# Patient Record
Sex: FEMALE | Race: WHITE | RURAL
[De-identification: names, ages, dates, MRNs, and addresses within clinical notes are randomized per-mention and may not be internally consistent; named-entity substitution may affect disease eponyms.]

---

## 2021-09-07 ENCOUNTER — OFFICE VISIT (OUTPATIENT)
Dept: RURAL CLINIC 4 | Facility: CLINIC | Age: 71
End: 2021-09-07
Payer: MEDICARE

## 2021-09-07 ENCOUNTER — CLAIMS CREATED FROM THE CLAIM WINDOW (OUTPATIENT)
Dept: RURAL CLINIC 4 | Facility: CLINIC | Age: 71
End: 2021-09-07
Payer: MEDICARE

## 2021-09-07 ENCOUNTER — WEB ENCOUNTER (OUTPATIENT)
Dept: RURAL CLINIC 4 | Facility: CLINIC | Age: 71
End: 2021-09-07

## 2021-09-07 DIAGNOSIS — Z86.010 HISTORY OF COLON POLYPS: ICD-10-CM

## 2021-09-07 DIAGNOSIS — Z80.0 FAMILY HISTORY OF RECTAL CANCER: ICD-10-CM

## 2021-09-07 DIAGNOSIS — K57.90 DIVERTICULOSIS: ICD-10-CM

## 2021-09-07 PROBLEM — 397881000: Status: ACTIVE | Noted: 2021-09-07

## 2021-09-07 PROBLEM — 428283002: Status: ACTIVE | Noted: 2021-09-07

## 2021-09-07 PROCEDURE — 99202 OFFICE O/P NEW SF 15 MIN: CPT | Performed by: INTERNAL MEDICINE

## 2021-09-07 PROCEDURE — 996 AG2 (NON BILLABLE): Performed by: INTERNAL MEDICINE

## 2021-09-07 RX ORDER — ROSUVASTATIN CALCIUM 10 MG/1
1 TABLET TABLET, FILM COATED ORAL ONCE A DAY
Status: ACTIVE | COMMUNITY

## 2021-09-07 RX ORDER — ANASTROZOLE 1 MG/1
1 TABLET TABLET, FILM COATED ORAL ONCE A DAY
Status: ACTIVE | COMMUNITY

## 2021-09-07 RX ORDER — GABAPENTIN 600 MG/1
1 TABLET TABLET, FILM COATED ORAL ONCE A DAY
Status: ACTIVE | COMMUNITY

## 2021-09-07 RX ORDER — BISACODYL 5 MG
TAKE 4 TABLET, DELAYED RELEASE (ENTERIC COATED) ORAL
Qty: 4 | OUTPATIENT
Start: 2021-09-07 | End: 2021-09-08

## 2021-09-07 RX ORDER — MULTIVIT-MIN/IRON/FOLIC ACID/K 18-600-40
AS DIRECTED CAPSULE ORAL
Status: ACTIVE | COMMUNITY

## 2021-09-07 RX ORDER — SODIUM, POTASSIUM,MAG SULFATES 17.5-3.13G
177 ML SOLUTION, RECONSTITUTED, ORAL ORAL
Qty: 1 KIT | Refills: 0 | OUTPATIENT
Start: 2021-09-07

## 2021-09-07 RX ORDER — CELECOXIB 200 MG/1
1 CAPSULE WITH FOOD CAPSULE ORAL ONCE A DAY
Status: ACTIVE | COMMUNITY

## 2021-09-07 RX ORDER — EVE PRIMROSE/LINOLEIC/G-LENIC 1000 MG
AS DIRECTED CAPSULE ORAL
Status: ACTIVE | COMMUNITY

## 2021-09-07 RX ORDER — ASPIRIN 81 MG/1
1 TABLET TABLET, COATED ORAL ONCE A DAY
Status: ACTIVE | COMMUNITY

## 2021-09-07 NOTE — HPI-TODAY'S VISIT:
Pt had a colonoscopy with Dr Del Castillo in 2014 noting one polyp and diverticulosis. she had a spell of diarrhea and loose stools. and no blood in the stool. she tells me that her dad had rectal cancer in his mid seventies.

## 2021-09-15 ENCOUNTER — TELEPHONE ENCOUNTER (OUTPATIENT)
Dept: URBAN - METROPOLITAN AREA CLINIC 92 | Facility: CLINIC | Age: 71
End: 2021-09-15

## 2021-09-30 ENCOUNTER — OFFICE VISIT (OUTPATIENT)
Dept: RURAL MEDICAL CENTER 2 | Facility: MEDICAL CENTER | Age: 71
End: 2021-09-30
Payer: MEDICARE

## 2021-09-30 DIAGNOSIS — Z80.0 BROTHER AT YOUNG AGE FAMILY HISTORY OF COLON CANCER: ICD-10-CM

## 2021-09-30 DIAGNOSIS — Z86.010 ADENOMAS PERSONAL HISTORY OF COLONIC POLYPS: ICD-10-CM

## 2021-09-30 DIAGNOSIS — D12.5 ADENOMA OF SIGMOID COLON: ICD-10-CM

## 2021-09-30 DIAGNOSIS — D12.3 ADENOMA OF TRANSVERSE COLON: ICD-10-CM

## 2021-09-30 PROCEDURE — 45385 COLONOSCOPY W/LESION REMOVAL: CPT | Performed by: INTERNAL MEDICINE

## 2021-09-30 RX ORDER — MULTIVIT-MIN/IRON/FOLIC ACID/K 18-600-40
AS DIRECTED CAPSULE ORAL
Status: ACTIVE | COMMUNITY

## 2021-09-30 RX ORDER — CELECOXIB 200 MG/1
1 CAPSULE WITH FOOD CAPSULE ORAL ONCE A DAY
Status: ACTIVE | COMMUNITY

## 2021-09-30 RX ORDER — ANASTROZOLE 1 MG/1
1 TABLET TABLET, FILM COATED ORAL ONCE A DAY
Status: ACTIVE | COMMUNITY

## 2021-09-30 RX ORDER — SODIUM, POTASSIUM,MAG SULFATES 17.5-3.13G
177 ML SOLUTION, RECONSTITUTED, ORAL ORAL
Qty: 1 KIT | Refills: 0 | Status: ACTIVE | COMMUNITY
Start: 2021-09-07

## 2021-09-30 RX ORDER — GABAPENTIN 600 MG/1
1 TABLET TABLET, FILM COATED ORAL ONCE A DAY
Status: ACTIVE | COMMUNITY

## 2021-09-30 RX ORDER — EVE PRIMROSE/LINOLEIC/G-LENIC 1000 MG
AS DIRECTED CAPSULE ORAL
Status: ACTIVE | COMMUNITY

## 2021-09-30 RX ORDER — ROSUVASTATIN CALCIUM 10 MG/1
1 TABLET TABLET, FILM COATED ORAL ONCE A DAY
Status: ACTIVE | COMMUNITY

## 2021-09-30 RX ORDER — ASPIRIN 81 MG/1
1 TABLET TABLET, COATED ORAL ONCE A DAY
Status: ACTIVE | COMMUNITY

## 2023-10-26 ENCOUNTER — OFFICE VISIT (OUTPATIENT)
Dept: RURAL CLINIC 4 | Facility: CLINIC | Age: 73
End: 2023-10-26
Payer: MEDICARE

## 2023-10-26 ENCOUNTER — DASHBOARD ENCOUNTERS (OUTPATIENT)
Age: 73
End: 2023-10-26

## 2023-10-26 VITALS
HEART RATE: 55 BPM | WEIGHT: 152 LBS | DIASTOLIC BLOOD PRESSURE: 49 MMHG | HEIGHT: 68 IN | TEMPERATURE: 97.3 F | BODY MASS INDEX: 23.04 KG/M2 | SYSTOLIC BLOOD PRESSURE: 133 MMHG

## 2023-10-26 DIAGNOSIS — R14.2 ERUCTATION: ICD-10-CM

## 2023-10-26 DIAGNOSIS — R14.1 GAS PAIN: ICD-10-CM

## 2023-10-26 DIAGNOSIS — R14.3 FLATULENCE: ICD-10-CM

## 2023-10-26 DIAGNOSIS — Z86.010 HISTORY OF ADENOMATOUS POLYP OF COLON: ICD-10-CM

## 2023-10-26 PROBLEM — 271834000: Status: ACTIVE | Noted: 2023-10-26

## 2023-10-26 PROBLEM — 271832001: Status: ACTIVE | Noted: 2023-10-26

## 2023-10-26 PROBLEM — 429047008: Status: ACTIVE | Noted: 2023-10-26

## 2023-10-26 PROBLEM — 45979003: Status: ACTIVE | Noted: 2023-10-26

## 2023-10-26 PROCEDURE — 99213 OFFICE O/P EST LOW 20 MIN: CPT | Performed by: NURSE PRACTITIONER

## 2023-10-26 RX ORDER — SODIUM, POTASSIUM,MAG SULFATES 17.5-3.13G
177 ML SOLUTION, RECONSTITUTED, ORAL ORAL
Qty: 1 KIT | Refills: 0 | Status: DISCONTINUED | COMMUNITY
Start: 2021-09-07

## 2023-10-26 RX ORDER — ASPIRIN 81 MG/1
1 TABLET TABLET, COATED ORAL EVERY OTHER DAY
Status: ACTIVE | COMMUNITY

## 2023-10-26 RX ORDER — EVE PRIMROSE/LINOLEIC/G-LENIC 1000 MG
AS DIRECTED CAPSULE ORAL
Status: DISCONTINUED | COMMUNITY

## 2023-10-26 RX ORDER — CELECOXIB 200 MG/1
1 CAPSULE WITH FOOD CAPSULE ORAL
Status: ACTIVE | COMMUNITY

## 2023-10-26 RX ORDER — ANASTROZOLE 1 MG/1
1 TABLET TABLET, FILM COATED ORAL ONCE A DAY
Status: ACTIVE | COMMUNITY

## 2023-10-26 RX ORDER — MULTIVIT-MIN/IRON/FOLIC ACID/K 18-600-40
AS DIRECTED CAPSULE ORAL
Status: ACTIVE | COMMUNITY

## 2023-10-26 RX ORDER — GABAPENTIN 600 MG/1
1 TABLET TABLET, FILM COATED ORAL TWICE A DAY
Status: ACTIVE | COMMUNITY

## 2023-10-26 RX ORDER — ROSUVASTATIN CALCIUM 10 MG/1
1 TABLET TABLET, FILM COATED ORAL EVERY OTHER DAY
Status: ACTIVE | COMMUNITY

## 2023-10-26 NOTE — HPI-ZZZTODAY'S VISIT
10/26/2023 The patient is here today with complaints of an increase in flatulence causing gas pain and sometimes uncontrollable.  She normally has a good bowel movement every day with intermittent constipation and recently added fiber chews and may be too soon to determine if the extra fiber is helping.  She denies abdominal pain or rectal bleeding. She occasionally feels bloated after eating. Gas X has not been helpful.  Her last colonoscopy was completed in September 2021 with findings of benign colon polyps, mild diverticulosis and internal hemorrhoids